# Patient Record
Sex: FEMALE | Race: BLACK OR AFRICAN AMERICAN | NOT HISPANIC OR LATINO | Employment: OTHER | URBAN - METROPOLITAN AREA
[De-identification: names, ages, dates, MRNs, and addresses within clinical notes are randomized per-mention and may not be internally consistent; named-entity substitution may affect disease eponyms.]

---

## 2017-04-25 ENCOUNTER — TELEPHONE (OUTPATIENT)
Dept: ORTHOPEDICS | Facility: CLINIC | Age: 67
End: 2017-04-25

## 2017-06-08 ENCOUNTER — OFFICE VISIT (OUTPATIENT)
Dept: ORTHOPEDICS | Facility: CLINIC | Age: 67
End: 2017-06-08
Payer: MEDICARE

## 2017-06-08 VITALS
BODY MASS INDEX: 50.36 KG/M2 | WEIGHT: 266.75 LBS | RESPIRATION RATE: 16 BRPM | HEART RATE: 87 BPM | DIASTOLIC BLOOD PRESSURE: 73 MMHG | SYSTOLIC BLOOD PRESSURE: 132 MMHG | HEIGHT: 61 IN

## 2017-06-08 DIAGNOSIS — G89.29 CHRONIC PAIN OF RIGHT KNEE: Primary | ICD-10-CM

## 2017-06-08 DIAGNOSIS — M17.9 OSTEOARTHRITIS OF KNEE, UNSPECIFIED: ICD-10-CM

## 2017-06-08 DIAGNOSIS — M79.642 BILATERAL HAND PAIN: Primary | ICD-10-CM

## 2017-06-08 DIAGNOSIS — M25.561 CHRONIC PAIN OF RIGHT KNEE: Primary | ICD-10-CM

## 2017-06-08 DIAGNOSIS — M79.641 BILATERAL HAND PAIN: Primary | ICD-10-CM

## 2017-06-08 DIAGNOSIS — M17.0 PRIMARY OSTEOARTHRITIS OF BOTH KNEES: ICD-10-CM

## 2017-06-08 PROCEDURE — 99213 OFFICE O/P EST LOW 20 MIN: CPT | Mod: PBBFAC,PO | Performed by: ORTHOPAEDIC SURGERY

## 2017-06-08 PROCEDURE — 99214 OFFICE O/P EST MOD 30 MIN: CPT | Mod: S$PBB,,, | Performed by: ORTHOPAEDIC SURGERY

## 2017-06-08 PROCEDURE — 99999 PR PBB SHADOW E&M-EST. PATIENT-LVL III: CPT | Mod: PBBFAC,,, | Performed by: ORTHOPAEDIC SURGERY

## 2017-06-08 RX ORDER — NEBIVOLOL HYDROCHLORIDE 20 MG/1
TABLET ORAL
COMMUNITY
Start: 2017-05-30

## 2017-06-08 RX ORDER — AMLODIPINE BESYLATE 10 MG/1
TABLET ORAL
COMMUNITY
Start: 2017-05-30

## 2017-06-08 RX ORDER — CLONIDINE HYDROCHLORIDE 0.2 MG/1
TABLET ORAL
COMMUNITY
Start: 2017-05-18

## 2017-06-08 NOTE — PROGRESS NOTES
"SUBJECTIVE:  Patient is status post Left index and ring finger trigger finger release.  She complains of Bilateral hand pain as well as Bilateral knee pain..  Patient complains of  5 /10 pain that is better with the  pain meds and aggravated with movement.  Past Medical History:   Diagnosis Date    CKD (chronic kidney disease) stage 3, GFR 30-59 ml/min     Diabetes mellitus     Diabetes mellitus     Glaucoma     Gout attack     Hyperlipidemia     Hypertension     Morbid obesity     Obstructive sleep apnea     Osteoarthritis      Past Surgical History:   Procedure Laterality Date    APPENDECTOMY      CATARACT EXTRACTION, BILATERAL      CHOLECYSTECTOMY      FINGER SURGERY      HAND SURGERY      HERNIA REPAIR      HYSTERECTOMY      left shoulder repair      RETINAL DETACHMENT SURGERY      right shoulder rotator cuff       Allergies   Allergen Reactions    Penicillins      Other reaction(s): Unable to obtain     Current Outpatient Prescriptions on File Prior to Visit   Medication Sig Dispense Refill    albuterol (PROAIR HFA) 90 mcg/actuation HFAA Inhale 2 Inhalers into the lungs Every 4 hours as needed.      ammonium lactate (LAC-HYDRIN) 12 % lotion 1 application continuous prn.      atenolol (TENORMIN) 100 MG tablet Take 1 tablet by mouth once daily.       atorvastatin (LIPITOR) 10 MG tablet Take 10 mg by mouth every evening.      BD ULTRA-FINE MICHELLE PEN NEEDLES 32 x 5/32 " Ndle   6    biotin 1 mg tablet Take 1 tablet by mouth 3 (three) times daily.       bumetanide (BUMEX) 2 MG tablet Take 1 tablet by mouth once daily.       calcium-vitamin D tablet 600 mg-200 units Take 1 tablet by mouth once daily.      clobetasol 0.05% (TEMOVATE) 0.05 % Oint Apply topically 2 (two) times daily.      colchicine (COLCRYS) 0.6 mg tablet Take 1 tablet by mouth Daily.      DICLOFENAC SODIUM (VOLTAREN TOP) Apply topically.      duloxetine (CYMBALTA) 60 MG capsule Take 60 mg by mouth 2 (two) times daily.   " "   febuxostat (ULORIC) 80 mg Tab Take 1 tablet by mouth Daily.      ferrous gluconate (FERGON) 240 (27 FE) MG tablet Take 1 tablet by mouth Twice daily.      fluorometholone 0.1% (FML) 0.1 % DrpS Place 1 drop into the right eye 2 (two) times daily.       hydrOXYzine (ATARAX) 25 MG tablet Take 25 mg by mouth every evening.       insulin aspart (NOVOLOG) 100 unit/mL injection Inject 30 Units into the skin 3 (three) times daily before meals.       levocetirizine (XYZAL) 5 MG tablet Take 5 mg by mouth once daily.      nabumetone (RELAFEN) 500 MG tablet Take 1 tablet by mouth Daily.      NOVOLOG FLEXPEN 100 unit/mL InPn pen INJECT 20 UNITS SUBCUTANEOUSLY 3 TIMES A DAY  6    ONE TOUCH ULTRA TEST Strp   6    potassium chloride SA (K-DUR,KLOR-CON) 20 MEQ tablet Take 1 tablet by mouth Daily.      raloxifene (EVISTA) 60 mg tablet Take 1 tablet by mouth Daily.      timolol maleate 0.5% (TIMOPTIC) 0.5 % Drop Inject 1 drop into the eye Daily.      TOUJEO SOLOSTAR 300 unit/mL (1.5 mL) InPn pen INJECT 160 UNITS SUBCUTANEOUSLY EVERY DAY  6    trazodone (DESYREL) 50 MG tablet Take 50 mg by mouth every evening.      valsartan (DIOVAN) 320 MG tablet Take 1 tablet by mouth Daily.      venlafaxine (EFFEXOR) 25 MG Tab Take 1 tablet by mouth once daily.      zolpidem (AMBIEN) 10 mg Tab Take 5 mg by mouth nightly as needed.       No current facility-administered medications on file prior to visit.      /73   Pulse 87   Resp 16   Ht 5' 1" (1.549 m)   Wt 121 kg (266 lb 12.1 oz)   BMI 50.40 kg/m²    ROS:  GENERAL: No fever, chills, fatigability or weight loss.  SKIN: No rashes, itching or changes in color or texture of skin.  HEAD: No headaches or recent head trauma.  EYES: Visual acuity fine. No photophobia, ocular pain or diplopia.  EARS: Denies ear pain, discharge or vertigo.  NOSE: No loss of smell, no epistaxis or postnasal drip.  MOUTH & THROAT: No hoarseness or change in voice. No excessive gum " bleeding.  NODES: Denies swollen glands.  CHEST: Denies CUETO, cyanosis, wheezing, cough and sputum production.  CARDIOVASCULAR: Denies chest pain, PND, orthopnea or reduced exercise tolerance.  ABDOMEN: Appetite fine. No weight loss. Denies diarrhea, abdominal pain, hematemesis or blood in stool.  URINARY: No flank pain, dysuria or hematuria.  PERIPHERAL VASCULAR: No claudication or cyanosis.  NEUROLOGIC: No history of seizures, paralysis, alteration of gait or coordination.  MUSCULOSKELETAL: See HPI    PE:  APPEARANCE: Well nourished, well developed, in no acute distress.   HEAD: Normocephalic, atraumatic.  EYES: PERRL. EOMI.   EARS: TM's intact. Light reflex normal. No retraction or perforation.   NOSE: Mucosa pink. Airway clear.  MOUTH & THROAT: No tonsillar enlargement. No pharyngeal erythema or exudate. No stridor.  NECK: Supple.   NODES: No cervical, axillary or inguinal lymph node enlargement.  CHEST: Lungs clear to auscultation.  CARDIOVASCULAR: Normal S1, S2. No rubs, murmurs or gallops.  ABDOMEN: Bowel sounds normal. Not distended. Soft. No tenderness or masses.  NEUROLOGIC: Cranial Nerves: II-XII grossly intact, also see MUSCULOSKELETAL  MUSCULOSKELETAL:       Left index and ring finger 2 plus radial  artery and ulnar artery pulses, light touch intact Left upper extremity.  All digits are warm. No erythema, no warmth, no drainage, No swelling, no significant tenderness    Right thumb negative triggering with full range of motion, the skin is intact and the patient is neurovascularly intact to       bilateral Knee Exam-abnormal    Gait-abnormal  Muscle Appearance:abnormal  Grooming:normal  Spine Alignment-normal  Muscle Atrophy-Positive  Deformities-Negative  Tenderness-Positive  Paresthesias-Negative  Range of Motion         Ext-normal, 0 degrees         Flex-abnormal  Muscle Strength-abnormal  Sensation-normal  Reflexes-normal  Crepitus-Positive                                Swelling-Negative  Effusion-  Positive                                Edema-Negative  Lachman-Negative                                Erythema-Negative  [facility]'s-Positive                              Apley Grind-Positive  Patellar Comp-Positive                         Alignment-normal/symmetric  Patellar Apprehension-Negative              Synovial fullness-Positive  Passive Patellar Tilt-normal  Patellar Tracking-normal   Patellar Glide-normal  Q-Angle at 90 degrees-normal  Patellar Grind-abnormal  [id number]  Fatigue-Negative                                     HS Tightness-Negative  Tests on Exam, No ligamentous laxity  Neurovascular Status-normal+2 DP and PT artery pulses  Skin-normal             ASSESSMENT:  Diagnosis  1.  Bilateral hand arthritis  2. Bilateral knee arthritis    PLAN:  Occupational therapy) and  Patient may benefit from  Bilateral knee Synvisc Injections  Follow-up in 3 weeks  Continue pain medication  Continue occupational therapy  synvisc

## 2017-06-08 NOTE — PATIENT INSTRUCTIONS
What is Arthritis?  Arthritis is a disease that affects the joints (the parts where bones meet and move). It can affect any joint in your body. There are many types of arthritis, including osteoarthritis and rheumatoid arthrtitis. If your symptoms are mild, medications may be enough to reduce pain and swelling. For more severe arthritis, surgery may be needed to improve the condition of the joint or replace the joint entirely.                  What causes arthritis?  Cartilage is a smooth substance that protects the ends of your bones and provides cushioning. When you have arthritis, this cartilage breaks down and can no longer protect your bones. The bones rub against each other, causing pain and swelling. Over time, bone spurs (small pieces of rough or splintered bone) may develop, and the joint's range of motion can become limited.  Symptoms  Some of the more common symptoms of arthritis include:  · Joint pain and stiffness. Pain and stiffness get worse with long periods of rest or using a joint too long or too hard.  · Joints that have lost normal shape and motion.  · Tender, inflamed joints. They may look red and feel warm.  · Grinding or popping noise with joint movement.   · Feeling tired all the time.  Reducing symptoms  Following a healthy lifestyle by losing weight and exercising can help reduce symptoms of osteoarthritis. Medicines can be very helpful for arthritis.     Date Last Reviewed: 9/10/2015  © 2196-9195 [employer]. [address] [place] [address], [place] [address]. All rights reserved. This information is not intended as a substitute for professional medical care. Always follow your healthcare professional's instructions.

## 2017-06-29 ENCOUNTER — TELEPHONE (OUTPATIENT)
Dept: ORTHOPEDICS | Facility: CLINIC | Age: 67
End: 2017-06-29

## 2018-06-25 ENCOUNTER — TELEPHONE (OUTPATIENT)
Dept: ORTHOPEDICS | Facility: CLINIC | Age: 68
End: 2018-06-25

## 2018-06-25 NOTE — TELEPHONE ENCOUNTER
Called the patient and left a message for her to give the office a call back when she gets the message.FP        ----- Message from Kellen Reed sent at 6/25/2018 11:45 AM CDT -----  Pt at [phone]//states she is having knee pain and glenn hand pain//she is wanting to know if possible to be worked in before his next available//please call//[email or web address]

## 2018-08-07 DIAGNOSIS — M25.561 RIGHT KNEE PAIN, UNSPECIFIED CHRONICITY: Primary | ICD-10-CM

## 2018-09-04 ENCOUNTER — HOSPITAL ENCOUNTER (OUTPATIENT)
Dept: RADIOLOGY | Facility: HOSPITAL | Age: 68
Discharge: HOME | End: 2018-09-04
Attending: ORTHOPAEDIC SURGERY
Payer: MEDICARE

## 2018-09-04 ENCOUNTER — OFFICE VISIT (OUTPATIENT)
Dept: ORTHOPEDICS | Facility: CLINIC | Age: 68
End: 2018-09-04
Payer: MEDICARE

## 2018-09-04 VITALS
BODY MASS INDEX: 50.22 KG/M2 | HEART RATE: 83 BPM | DIASTOLIC BLOOD PRESSURE: 67 MMHG | SYSTOLIC BLOOD PRESSURE: 146 MMHG | WEIGHT: 266 LBS | HEIGHT: 61 IN

## 2018-09-04 DIAGNOSIS — M19.049 ARTHRITIS OF HAND: ICD-10-CM

## 2018-09-04 DIAGNOSIS — G89.29 CHRONIC PAIN OF RIGHT KNEE: Primary | ICD-10-CM

## 2018-09-04 DIAGNOSIS — M25.561 CHRONIC PAIN OF RIGHT KNEE: Primary | ICD-10-CM

## 2018-09-04 DIAGNOSIS — R52 PAIN: ICD-10-CM

## 2018-09-04 DIAGNOSIS — M17.11 ARTHRITIS OF RIGHT KNEE: ICD-10-CM

## 2018-09-04 DIAGNOSIS — R52 PAIN: Primary | ICD-10-CM

## 2018-09-04 PROCEDURE — 99999 PR PBB SHADOW E&M-EST. PATIENT-LVL III: CPT | Mod: PBBFAC,,, | Performed by: ORTHOPAEDIC SURGERY

## 2018-09-04 PROCEDURE — 73560 X-RAY EXAM OF KNEE 1 OR 2: CPT | Mod: 26,59,LT, | Performed by: RADIOLOGY

## 2018-09-04 PROCEDURE — 73130 X-RAY EXAM OF HAND: CPT | Mod: 50,TC,FY,PO

## 2018-09-04 PROCEDURE — 99213 OFFICE O/P EST LOW 20 MIN: CPT | Mod: PBBFAC,25,PO | Performed by: ORTHOPAEDIC SURGERY

## 2018-09-04 PROCEDURE — 73562 X-RAY EXAM OF KNEE 3: CPT | Mod: 26,RT,, | Performed by: RADIOLOGY

## 2018-09-04 PROCEDURE — 73562 X-RAY EXAM OF KNEE 3: CPT | Mod: TC,FY,PO,RT

## 2018-09-04 PROCEDURE — 99215 OFFICE O/P EST HI 40 MIN: CPT | Mod: S$PBB,,, | Performed by: ORTHOPAEDIC SURGERY

## 2018-09-04 PROCEDURE — 73560 X-RAY EXAM OF KNEE 1 OR 2: CPT | Mod: TC,FY,PO,LT,59

## 2018-09-04 PROCEDURE — 73130 X-RAY EXAM OF HAND: CPT | Mod: 26,50,, | Performed by: RADIOLOGY

## 2018-09-04 RX ORDER — DICLOFENAC SODIUM 10 MG/G
2 GEL TOPICAL 4 TIMES DAILY
Qty: 1 TUBE | Refills: 2 | Status: SHIPPED | OUTPATIENT
Start: 2018-09-04 | End: 2018-09-14

## 2018-09-04 NOTE — PATIENT INSTRUCTIONS
Arthralgia    Arthralgia is the term for pain in or around the joint. It is a symptom, not a disease. This pain may involve one or more joints. In some cases, the pain moves from joint to joint.  There are many causes for joint pain. These include:  · Injury  · Osteoarthritis (wearing out of the joint surface)  · Gout (inflammation of the joint due to crystals in the joint fluid)  · Infection inside the joint    · Bursitis (inflammation of the fluid-filled sacs around the joint)  · Autoimmune disorders such as rheumatoid arthritis or lupus  · Tendonitis (inflammation of chords that attach muscle to bone)  Home care  · Rest the involved joint(s) until your symptoms improve.   · You may be prescribed pain medicine. If none is prescribed, you may use acetaminophen or ibuprofen to control pain and inflammation.  Follow-up care  Follow up with your healthcare provider or as advised.  When to seek medical advice  Contact your healthcare provider right away if any of the following occurs:  · Pain, swelling, or redness of joint increases  · Pain worsens or recurs after a period of improvement  · Pain moves to other joints  · You cannot bear weight on the affected joint   · You cannot move the affected joint  · Joint appears deformed  · New rash appears  · Fever of 100.4ºF (38ºC) or higher, or as directed by your healthcare provider  Date Last Reviewed: 3/1/2017  © 8743-8071 [employer]. [address] [place] [address], [place] [address]. All rights reserved. This information is not intended as a substitute for professional medical care. Always follow your healthcare professional's instructions.